# Patient Record
Sex: MALE | Race: ASIAN | NOT HISPANIC OR LATINO | URBAN - METROPOLITAN AREA
[De-identification: names, ages, dates, MRNs, and addresses within clinical notes are randomized per-mention and may not be internally consistent; named-entity substitution may affect disease eponyms.]

---

## 2020-06-03 ENCOUNTER — OFFICE VISIT (OUTPATIENT)
Dept: URBAN - METROPOLITAN AREA SURGERY CENTER 8 | Facility: SURGERY CENTER | Age: 45
End: 2020-06-03

## 2020-06-16 ENCOUNTER — DASHBOARD ENCOUNTERS (OUTPATIENT)
Age: 45
End: 2020-06-16

## 2020-06-16 ENCOUNTER — WEB ENCOUNTER (OUTPATIENT)
Dept: URBAN - METROPOLITAN AREA CLINIC 35 | Facility: CLINIC | Age: 45
End: 2020-06-16

## 2020-06-16 ENCOUNTER — OFFICE VISIT (OUTPATIENT)
Dept: URBAN - METROPOLITAN AREA CLINIC 35 | Facility: CLINIC | Age: 45
End: 2020-06-16

## 2020-06-16 VITALS — WEIGHT: 149 LBS | HEIGHT: 63 IN | BODY MASS INDEX: 26.4 KG/M2

## 2020-06-16 PROBLEM — 398050005 DIVERTICULAR DISEASE OF COLON: Status: ACTIVE | Noted: 2020-06-15

## 2020-06-16 RX ORDER — SODIUM, POTASSIUM,MAG SULFATES 17.5-3.13G
ML AS DIRECTED SOLUTION, RECONSTITUTED, ORAL ORAL
Qty: 1 KIT | Refills: 0 | Status: DISCONTINUED | COMMUNITY
Start: 2020-03-03

## 2020-06-16 NOTE — HPI-MIGRATED HPI
;   ;     Colorectal Cancer Screening : Patient presents today for a follow up of her colonoscopy. Patient admits/denies any complications after her procedure.   Patient admits __ BMs per day with __ stools.  Patient admits denies any rectal bleeding, melena, mucus, pruritus ani, or rectal pain at this time.  Colonoscopy report as documented below.   Last visit(03/03/2020) 45 y/o male who presents today for colorectal cancer screening consultation. Patient admit that he had a colonoscopy 10 years ago because of Diverticulitis. He admits that he hasn't had a colonoscopy since nor has he had any symptoms associated with his Diverticulitis.   Patient admits 1 bowel movement a day. Patients admits stool are firm. Patient admits that occasionally when he drinks cold water and milk his stools are loose patient also report that when he has that episode he will see mucus in his stool. Patient denies any episodes of rectal bleeding, melena, or mucus at this time.   ;   Patient presents today for a follow up of her colonoscopy. Patient admits/denies any complications after her procedure.   Patient admits __ BMs per day with __ stools.  Patient admits denies any rectal bleeding, melena, mucus, pruritus ani, or rectal pain at this time.  Colonoscopy report as documented below.     Last visit 45 y/o male who presents today for colorectal cancer screening consultation. Patient admit that he had a colonoscopy 10 years ago because of Diverticulitis. He admits that he hasn't had a colonoscopy since nor has he had any symptoms associated with his Diverticulitis.   Patient admits 1 bowel movement a day. Patients admits stool are firm. Patient admits that occasionally when he drinks cold water and milk his stools are loose patient also report that when he has that episode he will see mucus in his stool. Patient denies any episodes of rectal bleeding, melena, or mucus at this time.   ;

## 2020-06-16 NOTE — HPI-MIGRATED HPI
;     Colonoscopy Follow-Up : Patient presents today via televisit with consent for a follow up after his colonoscopy.  He admit over the course of two weeks following his procedure having 1 loose and watery bowel movement per day with occasional episodes of fecal urgency.   Currently admit 1 formed bowel movement per day.  Patient denies any rectal bleeding, melena, mucus, pruritus ani, or rectal pain at this time.   Last visit (03/03/2020)43 y/o male who presents today for colorectal cancer screening consultation. Patient admit that he had a colonoscopy 10 years ago because of Diverticulitis. He admits that he hasn't had a colonoscopy since nor has he had any symptoms associated with his Diverticulitis.   Patient admits 1 bowel movement a day. Patients admits stool are firm. Patient admits that occasionally when he drinks cold water and milk his stools are loose patient also report that when he has that episode he will see mucus in his stool. Patient denies any episodes of rectal bleeding, melena, or mucus at this time.   ;